# Patient Record
Sex: MALE | Race: BLACK OR AFRICAN AMERICAN | NOT HISPANIC OR LATINO | Employment: OTHER | URBAN - METROPOLITAN AREA
[De-identification: names, ages, dates, MRNs, and addresses within clinical notes are randomized per-mention and may not be internally consistent; named-entity substitution may affect disease eponyms.]

---

## 2023-10-10 ENCOUNTER — TELEPHONE (OUTPATIENT)
Dept: RADIATION ONCOLOGY | Facility: CLINIC | Age: 78
End: 2023-10-10
Payer: OTHER GOVERNMENT

## 2023-10-10 NOTE — TELEPHONE ENCOUNTER
Made call to schedule consult in radiation oncology with Dr eReder. No answer, LVM and call back number

## 2023-10-27 ENCOUNTER — OFFICE VISIT (OUTPATIENT)
Dept: RADIATION ONCOLOGY | Facility: CLINIC | Age: 78
End: 2023-10-27
Payer: OTHER GOVERNMENT

## 2023-10-27 VITALS
RESPIRATION RATE: 18 BRPM | BODY MASS INDEX: 36.62 KG/M2 | SYSTOLIC BLOOD PRESSURE: 131 MMHG | HEIGHT: 76 IN | HEART RATE: 66 BPM | TEMPERATURE: 97 F | DIASTOLIC BLOOD PRESSURE: 74 MMHG | WEIGHT: 300.69 LBS | OXYGEN SATURATION: 96 %

## 2023-10-27 DIAGNOSIS — C61 PROSTATE CANCER: ICD-10-CM

## 2023-10-27 PROCEDURE — 99999 PR PBB SHADOW E&M-EST. PATIENT-LVL III: CPT | Mod: PBBFAC,,,

## 2023-10-27 PROCEDURE — 99212 PR OFFICE/OUTPT VISIT, EST, LEVL II, 10-19 MIN: ICD-10-PCS | Mod: S$PBB,,, | Performed by: SPECIALIST

## 2023-10-27 PROCEDURE — 99212 OFFICE O/P EST SF 10 MIN: CPT | Mod: S$PBB,,, | Performed by: SPECIALIST

## 2023-10-27 PROCEDURE — 99999 PR PBB SHADOW E&M-EST. PATIENT-LVL III: ICD-10-PCS | Mod: PBBFAC,,,

## 2023-10-27 PROCEDURE — 99213 OFFICE O/P EST LOW 20 MIN: CPT | Mod: PBBFAC | Performed by: SPECIALIST

## 2023-10-27 NOTE — PROGRESS NOTES
Mr. Finney is a previous patient of [facility] now being seen in follow-up after combined modality therapy for high-risk prostate cancer manifest as a Kenn's score of 4+5.  He was treated with neoadjuvant hormonal therapy followed by external beam radiotherapy to his prostate seminal vesicles and pelvic lymph nodes with a palladium 103 implant as a boost.  His implant was performed one month ago.  He just received another Depo Lupron injection with a plan to continue to 18 months.  He has minimal lower urinary tract symptoms and no rectal symptoms.  His post implant dosimetry revealed an excellent implant with good bladder rectal and urethral sparing.  Impression:  He is doing well at the current time.  We have had a long discussion about the adverse metabolic consequences of hormonal suppression in activities to mitigate this particularly focusing on diet.  Plan: I will see him back in 6 months.  He will continue follow-up with Dr. Espinosa as scheduled.

## 2024-03-26 ENCOUNTER — TELEPHONE (OUTPATIENT)
Dept: RADIATION ONCOLOGY | Facility: CLINIC | Age: 79
End: 2024-03-26
Payer: OTHER GOVERNMENT
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
## 2024-04-26 ENCOUNTER — TELEPHONE (OUTPATIENT)
Dept: RADIATION ONCOLOGY | Facility: CLINIC | Age: 79
End: 2024-04-26
Payer: OTHER GOVERNMENT

## 2024-04-26 NOTE — TELEPHONE ENCOUNTER
Tried to reach patient/wife to see if he was ready to schedule f/u appt with Dr Reeder but there was no answer. Left a detailed message along with our phone number to call back. I also tried the wife's cell # but received a recording saying the number is unreachable.

## 2024-05-24 ENCOUNTER — OFFICE VISIT (OUTPATIENT)
Dept: RADIATION ONCOLOGY | Facility: CLINIC | Age: 79
End: 2024-05-24
Payer: OTHER GOVERNMENT

## 2024-05-24 VITALS
HEART RATE: 76 BPM | RESPIRATION RATE: 18 BRPM | SYSTOLIC BLOOD PRESSURE: 126 MMHG | BODY MASS INDEX: 37.59 KG/M2 | HEIGHT: 75 IN | OXYGEN SATURATION: 98 % | TEMPERATURE: 97 F | DIASTOLIC BLOOD PRESSURE: 71 MMHG

## 2024-05-24 DIAGNOSIS — C61 PROSTATE CANCER: Primary | ICD-10-CM

## 2024-05-24 PROCEDURE — 99214 OFFICE O/P EST MOD 30 MIN: CPT | Mod: PBBFAC | Performed by: SPECIALIST

## 2024-05-24 PROCEDURE — 99212 OFFICE O/P EST SF 10 MIN: CPT | Mod: S$PBB,,, | Performed by: SPECIALIST

## 2024-05-24 PROCEDURE — 99999 PR PBB SHADOW E&M-EST. PATIENT-LVL IV: CPT | Mod: PBBFAC,,, | Performed by: SPECIALIST

## 2024-05-24 RX ORDER — LORATADINE 10 MG/1
1 TABLET ORAL EVERY MORNING
COMMUNITY
Start: 2024-04-03 | End: 2025-04-03

## 2024-05-24 RX ORDER — HYDROCHLOROTHIAZIDE 12.5 MG/1
12.5 TABLET ORAL
COMMUNITY
Start: 2023-10-04

## 2024-05-24 RX ORDER — AMOXICILLIN 250 MG
2 CAPSULE ORAL NIGHTLY
COMMUNITY
Start: 2024-03-22 | End: 2025-03-22

## 2024-05-24 RX ORDER — NAPROXEN 375 MG/1
375 TABLET ORAL
COMMUNITY

## 2024-05-24 RX ORDER — SERTRALINE HYDROCHLORIDE 100 MG/1
100 TABLET, FILM COATED ORAL
COMMUNITY
Start: 2024-04-19

## 2024-05-24 RX ORDER — FINASTERIDE 5 MG/1
5 TABLET, FILM COATED ORAL
COMMUNITY
Start: 2024-04-10

## 2024-05-24 RX ORDER — SILDENAFIL 100 MG/1
100 TABLET, FILM COATED ORAL
COMMUNITY
Start: 2024-03-14

## 2024-05-24 RX ORDER — TAMSULOSIN HYDROCHLORIDE 0.4 MG/1
0.4 CAPSULE ORAL
COMMUNITY
Start: 2024-04-10

## 2024-05-24 RX ORDER — GABAPENTIN 300 MG/1
300 CAPSULE ORAL
COMMUNITY
Start: 2024-04-02

## 2024-05-24 RX ORDER — BICALUTAMIDE 50 MG/1
50 TABLET, FILM COATED ORAL
COMMUNITY
Start: 2024-04-10
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
## 2024-11-25 ENCOUNTER — LAB VISIT (OUTPATIENT)
Dept: LAB | Facility: HOSPITAL | Age: 79
End: 2024-11-25
Attending: SPECIALIST
Payer: OTHER GOVERNMENT

## 2024-11-25 ENCOUNTER — OFFICE VISIT (OUTPATIENT)
Dept: RADIATION ONCOLOGY | Facility: CLINIC | Age: 79
End: 2024-11-25
Payer: OTHER GOVERNMENT

## 2024-11-25 VITALS
DIASTOLIC BLOOD PRESSURE: 71 MMHG | OXYGEN SATURATION: 99 % | HEIGHT: 75 IN | WEIGHT: 300.06 LBS | BODY MASS INDEX: 37.31 KG/M2 | HEART RATE: 82 BPM | SYSTOLIC BLOOD PRESSURE: 136 MMHG | RESPIRATION RATE: 19 BRPM | TEMPERATURE: 99 F

## 2024-11-25 DIAGNOSIS — C61 PROSTATE CANCER: ICD-10-CM

## 2024-11-25 DIAGNOSIS — C61 PROSTATE CANCER: Primary | ICD-10-CM

## 2024-11-25 LAB
COMPLEXED PSA SERPL-MCNC: <0.01 NG/ML (ref 0–4)
TESTOST SERPL-MCNC: 114 NG/DL (ref 304–1227)

## 2024-11-25 PROCEDURE — 36415 COLL VENOUS BLD VENIPUNCTURE: CPT | Performed by: SPECIALIST

## 2024-11-25 PROCEDURE — 84403 ASSAY OF TOTAL TESTOSTERONE: CPT | Performed by: SPECIALIST

## 2024-11-25 PROCEDURE — 84153 ASSAY OF PSA TOTAL: CPT | Performed by: SPECIALIST

## 2024-11-25 PROCEDURE — 99999 PR PBB SHADOW E&M-EST. PATIENT-LVL IV: CPT | Mod: PBBFAC,,, | Performed by: SPECIALIST

## 2024-11-25 PROCEDURE — 99212 OFFICE O/P EST SF 10 MIN: CPT | Mod: S$PBB,,, | Performed by: SPECIALIST

## 2024-11-25 PROCEDURE — 99214 OFFICE O/P EST MOD 30 MIN: CPT | Mod: PBBFAC | Performed by: SPECIALIST
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
## 2025-02-19 DIAGNOSIS — S82.872A CLOSED DISPLACED PILON FRACTURE OF LEFT TIBIA, INITIAL ENCOUNTER: Primary | ICD-10-CM

## 2025-02-19 NOTE — PROGRESS NOTES
"            [address] [facility] [place] [address]   Phone [phone]  Fax [phone]           CHIEF COMPLAINT: Pain of the Left Ankle (2/10) and Pain of the Left Lower Leg       HISTORY OF PRESENT ILLNESS (JN) (02/24/2025):    History of Present Illness    HPI:  David presents for a follow-up visit almost a year after a medical procedure involving nail insertion into his bone. He reports swelling in the affected area and a sensation, particularly after prolonged walking. He describes feeling it "leveling" and sometimes experiencing discomfort on the bottom of the foot. His discomfort is not constant.    Swelling is present on both sides of the affected area, with one side being worse. He is currently taking a fluid pill for this issue. A small scab is present in the area, and he sometimes feels tightness there.    His recovery has been complicated by prostate cancer treatment, which has slowed down the healing process. He recently stopped taking cancer medications, as these were affecting his stamina and ability to walk long distances.    He discloses a recent minor injury, stating he walked into the bed. He describes this incident as extremely painful.    David denies any new pain in the shin area.    PREVIOUS TREATMENTS:  - Nail insertion into the bone: Approximately one year ago    MEDICATIONS:  - Fluid pill (likely a diuretic medication)  - Prostate cancer medication: Recently discontinued          PAST MEDICAL HISTORY:    History reviewed. No pertinent past medical history.    No results found for: "HGBA1C"     PAST SURGICAL HISTORY:    Past Surgical History:   Procedure Laterality Date    FOOT SURGERY          MEDICATIONS:  Current Medications[1]     There are no discontinued medications.      ALLERGIES:     Review of patient's allergies indicates:  No Known Allergies         FAMILY HISTORY:   No family history on file.        SOCIAL HISTORY:    Social History     Socioeconomic History    " "Marital status:    Tobacco Use    Smoking status: Never     Passive exposure: Never    Smokeless tobacco: Never   Substance and Sexual Activity    Alcohol use: Yes     Social Drivers of Health     Food Insecurity: No Food Insecurity (3/22/2024)    Received from [facility]    Hunger Vital Sign     Worried About Running Out of Food in the Last Year: Never true     Ran Out of Food in the Last Year: Never true   Transportation Needs: No Transportation Needs (4/2/2024)    [facility]    PRAPARE - Transportation     Lack of Transportation (Medical): No     Lack of Transportation (Non-Medical): No       PHYSICAL EXAMINATION:     Estimated body mass index is 37.5 kg/m² as calculated from the following:    Height as of 11/25/24: 6' 3" (1.905 m).    Weight as of 11/25/24: 136.1 kg (300 lb 0.7 oz).   ASSISTIVE DEVICE:  Cane    MUSCULOSKELETAL:    Ankle Exam (affected extremity):   Inspection:         Gross deformity   (-)         Erythema   (-)        Ecchymosis   (-)          Swelling   (+) diffuse around operative extremity           Open wounds   (-)         Surgical scars   (+)                    Standing alignment:  Not tested   Palpation tenderness:  Bony:  Hindfoot:  Proximal fibula  (-)  Calcaneus  (-)   Distal fibula  (-)  Talus   (-)   Medial malleolus  (-)  CC joint/cuboid  (-)   Tibiotalar joint  (-)  Lateral gutter  (-)  Plantar fascia  (-)  Medial gutter  (-)   Midfoot:   TN joint   (-)   NC joints   (-)   TMT joints   (-)   Forefoot:   (-)      Soft tissue:     Tendons:    Achilles midsubstance (-)  Peroneal tendons  (-)   Achilles insertion  (-)  Posterior tibial tendon (-)   Retrocalcaneal bursa (-)  Anterior tibial tendon  (-)   Ligaments:   ATFL   (-)  Deltoid   (-)   CFL   (-)  Syndesmosis  (-)  ROM:  No motion through tibiotalar and " subtalar joint       Sensory:  Decreased sensation to light touch in Sa/Art/DP/SP/T nerve distributions      Motor:               Fires EHL/FHL/Tibialis anterior/Gastrocsoleus   Vascular:  Foot WWP with brisk capillary refill    DP/PT pulses palpable  No calf pain  He does have a lot of tenderness over the nail insertion site plantar heel stat has  Mild tenderness over tip of nail proximally        IMAGING:      X-Ray Ankle Complete Left  Narrative: EXAMINATION:  XR TIBIA FIBULA 2 VIEW LEFT; XR ANKLE COMPLETE 3 VIEW LEFT    CLINICAL HISTORY:  Displaced pilon fracture of left tibia, initial encounter for closed fracture    TECHNIQUE:  AP and lateral views of the left tibia and fibula were performed.  Three view left ankle    COMPARISON:  None.    FINDINGS:  Osteopenia limits sensitivity for acute fracture.  Orthopedic hardware fusing the tibiotalar and subtalar joints noted.  Old fracture deformity suspected of the distal tibia.  Prominent degenerative findings of the knee noted with less prevalent degenerative changes of the ankle/hindfoot.  Generalized soft tissue edema of the distal extremity/ankle.  Impression: As above    Electronically signed by: Luis Miguel Lara MD  Date:    02/24/2025  Time:    10:02  X-Ray Tibia Fibula 2 View Left  Narrative: EXAMINATION:  XR TIBIA FIBULA 2 VIEW LEFT; XR ANKLE COMPLETE 3 VIEW LEFT    CLINICAL HISTORY:  Displaced pilon fracture of left tibia, initial encounter for closed fracture    TECHNIQUE:  AP and lateral views of the left tibia and fibula were performed.  Three view left ankle    COMPARISON:  None.    FINDINGS:  Osteopenia limits sensitivity for acute fracture.  Orthopedic hardware fusing the tibiotalar and subtalar joints noted.  Old fracture deformity suspected of the distal tibia.  Prominent degenerative findings of the knee noted with less prevalent degenerative changes of the ankle/hindfoot.  Generalized soft tissue edema of the distal extremity/ankle.  Impression:  As above    Electronically signed by: Luis Miguel Lara MD  Date:    02/24/2025  Time:    10:02           ASSESSMENT: 79 y.o. male  with:   Left ORIF of pilon, removal of left calcaneal pin, 3/21/24 with CT with metal reduction of the left tib/fib obtained at [facility] on 7/1/24 showing:  Distal tibial diametaphyseal/plafond fracture sequela with delayed healing/nonunion.  Status post tibiotalocalcaneal arthrodesis without joint space narrowing/ankylosis.  Moderate soft tissue swelling.  Marked soleus muscle belly atrophy. Intact Achilles tendon.  Left knee arthritis exacerbation with MRI of the left knee obtained at [facility] on 5/16/24 showing:   No fracture identified.  Chronic ACL rupture.  Severe medial and lateral compartment osteoarthritis. Mild to moderate patellofemoral osteoarthritis.  Extensive tearing of the medial and lateral menisci with possible superimposed prior meniscectomy.  Moderate joint effusion. Lateral subcutaneous edema.  Application of multiplanar external fixation left lower extremity, closed reduction of pilon fracture, 3/13/24  External fixator removal left lower extremity, 3/20/24  Vitamin D 30.2, 3/13/24. Repeat vitamin D 33.9, 7/3/24.  WBC 4.98 (normal), CRP 1.0 (high), ESR 74 (high), PTH 22.1 (normal), Testosterone 7.05 (low), TSH 1.46 (normal), 7/3/24  Prostate cancer    PLAN:   Data:  I independently visualized and interpreted xray images today (see report above).  I reviewed available old/outside records.  PT/OT:  He has completed physical therapy  I offer him an appointment at the lymphedema Clinic which he declined  Medications:   Continue daily vitamin D  DME and weight bearing status:   He has a hinged knee brace   He [place] prescription to get supportive shoes in the past  WBAT, activities as tolerated  No longer needs the bone stimulator  Education:   I had a long discussion with the patient about the causes, treatments, and prognosis/natural history  for post-operative ORIF pilon.  We discussed effective ways to improve symptoms as well as what types of activities may make the symptoms/prognosis worse. We discussed signs and symptoms and other reasons to return to clinic sooner.    Return to clinic:   As needed.  He was counseled about periprosthetic fracture  Imaging needed at next follow-up: WBAT 2v tibfib, 3v ankle     This note was generated with the assistance of ambient listening technology. Verbal consent was obtained by the patient and accompanying visitor(s) for the recording of patient appointment to facilitate this note. I attest to having reviewed and edited the generated note for accuracy, though some syntax or spelling errors may persist. Please contact the author of this note for any clarification.              Physician Signature: Karny Irving M.D.       Official Website  Schedule An Appointment            [1]   Current Outpatient Medications:     hydroCHLOROthiazide (HYDRODIURIL) 12.5 MG Tab, 12.5 mg., Disp: , Rfl:     loratadine (CLARITIN) 10 mg tablet, Take 1 tablet by mouth every morning., Disp: , Rfl:     naproxen (NAPROSYN) 375 MG tablet, Take 375 mg by mouth., Disp: , Rfl:     senna-docusate 8.6-50 mg (PERICOLACE) 8.6-50 mg per tablet, Take 2 tablets by mouth every evening., Disp: , Rfl:     sildenafiL (VIAGRA) 100 MG tablet, 100 mg., Disp: , Rfl:     bicalutamide (CASODEX) 50 MG Tab, 50 mg. (Patient not taking: Reported on 2/24/2025.), Disp: , Rfl:     finasteride (PROSCAR) 5 mg tablet, 5 mg. (Patient not taking: Reported on 2/24/2025), Disp: , Rfl:     gabapentin (NEURONTIN) 300 MG capsule, 300 mg. (Patient not taking: Reported on 2/24/2025), Disp: , Rfl:     sertraline (ZOLOFT) 100 MG tablet, 100 mg. (Patient not taking: Reported on 2/24/2025), Disp: , Rfl:     tamsulosin (FLOMAX) 0.4 mg Cap, 0.4 mg. (Patient not taking: Reported on 2/24/2025), Disp: , Rfl:

## 2025-02-24 ENCOUNTER — HOSPITAL ENCOUNTER (OUTPATIENT)
Dept: RADIOLOGY | Facility: HOSPITAL | Age: 80
Discharge: HOME | End: 2025-02-24
Attending: ORTHOPAEDIC SURGERY
Payer: OTHER GOVERNMENT

## 2025-02-24 ENCOUNTER — OFFICE VISIT (OUTPATIENT)
Dept: ORTHOPEDICS | Facility: CLINIC | Age: 80
End: 2025-02-24
Payer: OTHER GOVERNMENT

## 2025-02-24 DIAGNOSIS — S82.872A CLOSED DISPLACED PILON FRACTURE OF LEFT TIBIA, INITIAL ENCOUNTER: ICD-10-CM

## 2025-02-24 DIAGNOSIS — S82.872D CLOSED DISPLACED PILON FRACTURE OF LEFT TIBIA WITH ROUTINE HEALING: Primary | ICD-10-CM

## 2025-02-24 PROCEDURE — 99204 OFFICE O/P NEW MOD 45 MIN: CPT | Mod: S$PBB,,, | Performed by: ORTHOPAEDIC SURGERY

## 2025-02-24 PROCEDURE — 73610 X-RAY EXAM OF ANKLE: CPT | Mod: 26,LT,, | Performed by: RADIOLOGY

## 2025-02-24 PROCEDURE — 99213 OFFICE O/P EST LOW 20 MIN: CPT | Mod: PBBFAC,25 | Performed by: ORTHOPAEDIC SURGERY

## 2025-02-24 PROCEDURE — 73610 X-RAY EXAM OF ANKLE: CPT | Mod: TC,LT

## 2025-02-24 PROCEDURE — 73590 X-RAY EXAM OF LOWER LEG: CPT | Mod: 26,LT,, | Performed by: RADIOLOGY

## 2025-02-24 PROCEDURE — 73590 X-RAY EXAM OF LOWER LEG: CPT | Mod: TC,LT

## 2025-02-24 PROCEDURE — 99999 PR PBB SHADOW E&M-EST. PATIENT-LVL III: CPT | Mod: PBBFAC,,, | Performed by: ORTHOPAEDIC SURGERY

## 2025-02-24 NOTE — PATIENT INSTRUCTIONS
Your feedback means a lot to Dr. Irving and her office staff! If you have a moment, please feel free to leave a review specifically about about your experience with Dr. Irving and her office staff!

## 2025-02-27 ENCOUNTER — OFFICE VISIT (OUTPATIENT)
Dept: RADIATION ONCOLOGY | Facility: CLINIC | Age: 80
End: 2025-02-27
Payer: OTHER GOVERNMENT

## 2025-02-27 VITALS
SYSTOLIC BLOOD PRESSURE: 151 MMHG | RESPIRATION RATE: 18 BRPM | WEIGHT: 308.88 LBS | OXYGEN SATURATION: 96 % | BODY MASS INDEX: 39.64 KG/M2 | DIASTOLIC BLOOD PRESSURE: 79 MMHG | HEART RATE: 75 BPM | TEMPERATURE: 99 F | HEIGHT: 74 IN

## 2025-02-27 DIAGNOSIS — C61 PROSTATE CANCER: Primary | ICD-10-CM

## 2025-02-27 PROCEDURE — 99214 OFFICE O/P EST MOD 30 MIN: CPT | Mod: PBBFAC | Performed by: SPECIALIST

## 2025-02-27 PROCEDURE — 99999 PR PBB SHADOW E&M-EST. PATIENT-LVL IV: CPT | Mod: PBBFAC,,, | Performed by: SPECIALIST

## 2025-02-27 NOTE — PROGRESS NOTES
Mr. Finney returns on short interval follow-up due to complaints of fatigue when I last saw him.  We did stop his trocar and he states his fatigue is improved as well as his hot flashes have resolved.  Does have nocturia 3 times nightly.  [facility] and per his history is testosterone is recovering in his PSA remains very very low.  A postvoid residual today is 3 cc.  Impression: We have had discussions regarding other things that may be awakening him at night.  I do not believe that alpha blockers are indicated.  Plan: I will see him back in 1 year.  I appreciate participating in this delightful gentleman's care.

## 2025-04-15 ENCOUNTER — PATIENT MESSAGE (OUTPATIENT)
Dept: NEUROLOGY | Facility: CLINIC | Age: 80
End: 2025-04-15
Payer: OTHER GOVERNMENT